# Patient Record
Sex: MALE | URBAN - METROPOLITAN AREA
[De-identification: names, ages, dates, MRNs, and addresses within clinical notes are randomized per-mention and may not be internally consistent; named-entity substitution may affect disease eponyms.]

---

## 2017-03-30 ENCOUNTER — APPOINTMENT (OUTPATIENT)
Dept: URBAN - METROPOLITAN AREA CLINIC 319 | Age: 72
Setting detail: DERMATOLOGY
End: 2017-03-30

## 2017-03-30 DIAGNOSIS — H61.03 CHONDRITIS OF EXTERNAL EAR: ICD-10-CM

## 2017-03-30 DIAGNOSIS — L82.1 OTHER SEBORRHEIC KERATOSIS: ICD-10-CM

## 2017-03-30 DIAGNOSIS — D22 MELANOCYTIC NEVI: ICD-10-CM

## 2017-03-30 PROBLEM — D22.5 MELANOCYTIC NEVI OF TRUNK: Status: ACTIVE | Noted: 2017-03-30

## 2017-03-30 PROBLEM — H61.031 CHONDRITIS OF RIGHT EXTERNAL EAR: Status: ACTIVE | Noted: 2017-03-30

## 2017-03-30 PROCEDURE — OTHER REASSURANCE: OTHER

## 2017-03-30 PROCEDURE — OTHER COUNSELING: OTHER

## 2017-03-30 PROCEDURE — 99213 OFFICE O/P EST LOW 20 MIN: CPT

## 2017-03-30 ASSESSMENT — LOCATION DETAILED DESCRIPTION DERM
LOCATION DETAILED: RIGHT SUPERIOR HELIX
LOCATION DETAILED: LEFT LATERAL FOREHEAD
LOCATION DETAILED: EPIGASTRIC SKIN
LOCATION DETAILED: LEFT RIB CAGE

## 2017-03-30 ASSESSMENT — LOCATION ZONE DERM
LOCATION ZONE: TRUNK
LOCATION ZONE: FACE
LOCATION ZONE: EAR

## 2017-03-30 ASSESSMENT — LOCATION SIMPLE DESCRIPTION DERM
LOCATION SIMPLE: ABDOMEN
LOCATION SIMPLE: RIGHT EAR
LOCATION SIMPLE: LEFT FOREHEAD

## 2018-06-27 ENCOUNTER — APPOINTMENT (OUTPATIENT)
Dept: URBAN - METROPOLITAN AREA CLINIC 319 | Age: 73
Setting detail: DERMATOLOGY
End: 2018-06-27

## 2018-06-27 DIAGNOSIS — B35.1 TINEA UNGUIUM: ICD-10-CM

## 2018-06-27 DIAGNOSIS — L82.1 OTHER SEBORRHEIC KERATOSIS: ICD-10-CM

## 2018-06-27 PROCEDURE — OTHER TREATMENT REGIMEN: OTHER

## 2018-06-27 PROCEDURE — OTHER PRESCRIPTION: OTHER

## 2018-06-27 PROCEDURE — 99213 OFFICE O/P EST LOW 20 MIN: CPT

## 2018-06-27 PROCEDURE — OTHER COUNSELING: OTHER

## 2018-06-27 PROCEDURE — OTHER REASSURANCE: OTHER

## 2018-06-27 RX ORDER — TRIAMCINOLONE ACETONIDE 1 MG/ML
LOTION TOPICAL
Qty: 1 | Refills: 0 | Status: ERX | COMMUNITY
Start: 2018-06-27

## 2018-06-27 ASSESSMENT — LOCATION SIMPLE DESCRIPTION DERM
LOCATION SIMPLE: ABDOMEN
LOCATION SIMPLE: LEFT GREAT TOE
LOCATION SIMPLE: RIGHT LOWER BACK

## 2018-06-27 ASSESSMENT — LOCATION ZONE DERM
LOCATION ZONE: TRUNK
LOCATION ZONE: TOENAIL

## 2018-06-27 ASSESSMENT — LOCATION DETAILED DESCRIPTION DERM
LOCATION DETAILED: LEFT GREAT TOENAIL
LOCATION DETAILED: RIGHT SUPERIOR MEDIAL LOWER BACK
LOCATION DETAILED: PERIUMBILICAL SKIN

## 2018-06-27 NOTE — PROCEDURE: TREATMENT REGIMEN
Detail Level: Detailed
Plan: If culture is negative will call in diprolene ointment if positive econozole cream ,lamisil \\nWill culture in  1 month with nurse
Otc Regimen: Apply sunscreen daily

## 2018-07-27 ENCOUNTER — APPOINTMENT (OUTPATIENT)
Dept: URBAN - METROPOLITAN AREA CLINIC 319 | Age: 73
Setting detail: DERMATOLOGY
End: 2018-07-27

## 2018-07-27 DIAGNOSIS — B35.1 TINEA UNGUIUM: ICD-10-CM

## 2018-07-27 PROCEDURE — OTHER NAIL CLIPPING FOR PAS: OTHER

## 2018-07-27 ASSESSMENT — LOCATION DETAILED DESCRIPTION DERM: LOCATION DETAILED: LEFT GREAT TOENAIL

## 2018-07-27 ASSESSMENT — LOCATION SIMPLE DESCRIPTION DERM: LOCATION SIMPLE: LEFT GREAT TOE

## 2018-07-27 ASSESSMENT — LOCATION ZONE DERM: LOCATION ZONE: TOENAIL

## 2018-09-19 ENCOUNTER — APPOINTMENT (OUTPATIENT)
Dept: URBAN - METROPOLITAN AREA CLINIC 319 | Age: 73
Setting detail: DERMATOLOGY
End: 2018-09-19

## 2018-09-19 DIAGNOSIS — B35.1 TINEA UNGUIUM: ICD-10-CM

## 2018-09-19 DIAGNOSIS — T07XXXA INSECT BITE, NONVENOMOUS, OF OTHER, MULTIPLE, AND UNSPECIFIED SITES, WITHOUT MENTION OF INFECTION: ICD-10-CM

## 2018-09-19 PROBLEM — S00.86XA INSECT BITE (NONVENOMOUS) OF OTHER PART OF HEAD, INITIAL ENCOUNTER: Status: ACTIVE | Noted: 2018-09-19

## 2018-09-19 PROCEDURE — OTHER REASSURANCE: OTHER

## 2018-09-19 PROCEDURE — OTHER NAIL CLIPPING FOR PAS: OTHER

## 2018-09-19 PROCEDURE — OTHER COUNSELING: OTHER

## 2018-09-19 PROCEDURE — 99213 OFFICE O/P EST LOW 20 MIN: CPT

## 2018-09-19 PROCEDURE — OTHER TREATMENT REGIMEN: OTHER

## 2018-09-19 ASSESSMENT — LOCATION DETAILED DESCRIPTION DERM
LOCATION DETAILED: LEFT GREAT TOENAIL
LOCATION DETAILED: RIGHT INFERIOR CENTRAL MALAR CHEEK

## 2018-09-19 ASSESSMENT — LOCATION SIMPLE DESCRIPTION DERM
LOCATION SIMPLE: LEFT GREAT TOE
LOCATION SIMPLE: RIGHT CHEEK

## 2018-09-19 ASSESSMENT — LOCATION ZONE DERM
LOCATION ZONE: FACE
LOCATION ZONE: TOENAIL

## 2018-09-19 NOTE — PROCEDURE: TREATMENT REGIMEN
Plan: If PAS is negative will give a stronger cortisone
Continue Regimen: Triamcinolone acetonide 0.1 % lotion (Apply twice a day to toenails)
Detail Level: Detailed

## 2018-09-27 ENCOUNTER — RX ONLY (RX ONLY)
Age: 73
End: 2018-09-27

## 2018-09-27 RX ORDER — BETAMETHASONE DIPROPIONATE 0.5 MG/G
OINTMENT TOPICAL
Qty: 1 | Refills: 0 | Status: ERX | COMMUNITY
Start: 2018-09-27

## 2019-04-02 ENCOUNTER — APPOINTMENT (OUTPATIENT)
Dept: URBAN - METROPOLITAN AREA CLINIC 319 | Age: 74
Setting detail: DERMATOLOGY
End: 2019-04-02

## 2019-04-02 DIAGNOSIS — L60.3 NAIL DYSTROPHY: ICD-10-CM

## 2019-04-02 PROCEDURE — OTHER COUNSELING: OTHER

## 2019-04-02 PROCEDURE — OTHER TREATMENT REGIMEN: OTHER

## 2019-04-02 ASSESSMENT — LOCATION SIMPLE DESCRIPTION DERM: LOCATION SIMPLE: LEFT GREAT TOE

## 2019-04-02 ASSESSMENT — LOCATION DETAILED DESCRIPTION DERM: LOCATION DETAILED: LEFT GREAT TOENAIL

## 2019-04-02 ASSESSMENT — LOCATION ZONE DERM: LOCATION ZONE: TOENAIL

## 2019-04-02 NOTE — PROCEDURE: TREATMENT REGIMEN
Detail Level: Detailed
Discontinue Regimen: diprolene ointment twice a day to affected nails.\\nTriamcinolone lotion

## 2024-04-23 ENCOUNTER — APPOINTMENT (OUTPATIENT)
Dept: URBAN - METROPOLITAN AREA CLINIC 310 | Age: 79
Setting detail: DERMATOLOGY
End: 2024-04-23

## 2024-04-23 DIAGNOSIS — L57.0 ACTINIC KERATOSIS: ICD-10-CM

## 2024-04-23 DIAGNOSIS — D22 MELANOCYTIC NEVI: ICD-10-CM

## 2024-04-23 DIAGNOSIS — L82.1 OTHER SEBORRHEIC KERATOSIS: ICD-10-CM

## 2024-04-23 DIAGNOSIS — Z12.83 ENCOUNTER FOR SCREENING FOR MALIGNANT NEOPLASM OF SKIN: ICD-10-CM

## 2024-04-23 DIAGNOSIS — D18.0 HEMANGIOMA: ICD-10-CM

## 2024-04-23 PROBLEM — D22.9 MELANOCYTIC NEVI, UNSPECIFIED: Status: ACTIVE | Noted: 2024-04-23

## 2024-04-23 PROBLEM — D18.01 HEMANGIOMA OF SKIN AND SUBCUTANEOUS TISSUE: Status: ACTIVE | Noted: 2024-04-23

## 2024-04-23 PROCEDURE — OTHER MIPS QUALITY: OTHER

## 2024-04-23 PROCEDURE — OTHER COUNSELING: OTHER

## 2024-04-23 PROCEDURE — 17000 DESTRUCT PREMALG LESION: CPT

## 2024-04-23 PROCEDURE — OTHER REASSURANCE: OTHER

## 2024-04-23 PROCEDURE — OTHER SUNSCREEN RECOMMENDATIONS: OTHER

## 2024-04-23 PROCEDURE — OTHER LIQUID NITROGEN: OTHER

## 2024-04-23 PROCEDURE — 99203 OFFICE O/P NEW LOW 30 MIN: CPT | Mod: 25

## 2024-04-23 PROCEDURE — 17003 DESTRUCT PREMALG LES 2-14: CPT

## 2024-04-23 ASSESSMENT — LOCATION DETAILED DESCRIPTION DERM
LOCATION DETAILED: NASAL SUPRATIP
LOCATION DETAILED: NASAL TIP
LOCATION DETAILED: NASAL DORSUM
LOCATION DETAILED: LEFT NASAL ALA

## 2024-04-23 ASSESSMENT — LOCATION SIMPLE DESCRIPTION DERM
LOCATION SIMPLE: LEFT NOSE
LOCATION SIMPLE: NOSE

## 2024-04-23 ASSESSMENT — LOCATION ZONE DERM: LOCATION ZONE: NOSE

## 2024-04-23 NOTE — PROCEDURE: MIPS QUALITY
Quality 226: Preventive Care And Screening: Tobacco Use: Screening And Cessation Intervention: Patient screened for tobacco use and is an ex/non-smoker
Quality 431: Preventive Care And Screening: Unhealthy Alcohol Use - Screening: Patient not identified as an unhealthy alcohol user when screened for unhealthy alcohol use using a systematic screening method
Detail Level: Detailed
Quality 47: Advance Care Plan: Advance Care Planning discussed and documented in the medical record; patient did not wish or was not able to name a surrogate decision maker or provide an advance care plan.
Quality 130: Documentation Of Current Medications In The Medical Record: Current Medications Documented

## 2025-04-23 ENCOUNTER — APPOINTMENT (OUTPATIENT)
Dept: URBAN - METROPOLITAN AREA CLINIC 310 | Age: 80
Setting detail: DERMATOLOGY
End: 2025-04-23

## 2025-04-23 DIAGNOSIS — H61.03 CHONDRITIS OF EXTERNAL EAR: ICD-10-CM

## 2025-04-23 DIAGNOSIS — Z71.89 OTHER SPECIFIED COUNSELING: ICD-10-CM

## 2025-04-23 DIAGNOSIS — D22 MELANOCYTIC NEVI: ICD-10-CM

## 2025-04-23 DIAGNOSIS — D18.0 HEMANGIOMA: ICD-10-CM

## 2025-04-23 DIAGNOSIS — L57.8 OTHER SKIN CHANGES DUE TO CHRONIC EXPOSURE TO NONIONIZING RADIATION: ICD-10-CM

## 2025-04-23 DIAGNOSIS — L82.1 OTHER SEBORRHEIC KERATOSIS: ICD-10-CM

## 2025-04-23 PROBLEM — D22.9 MELANOCYTIC NEVI, UNSPECIFIED: Status: ACTIVE | Noted: 2025-04-23

## 2025-04-23 PROBLEM — D18.01 HEMANGIOMA OF SKIN AND SUBCUTANEOUS TISSUE: Status: ACTIVE | Noted: 2025-04-23

## 2025-04-23 PROBLEM — H61.032 CHONDRITIS OF LEFT EXTERNAL EAR: Status: ACTIVE | Noted: 2025-04-23

## 2025-04-23 PROCEDURE — OTHER MIPS QUALITY: OTHER

## 2025-04-23 PROCEDURE — 17110 DESTRUCT B9 LESION 1-14: CPT

## 2025-04-23 PROCEDURE — OTHER PRESCRIPTION MEDICATION MANAGEMENT: OTHER

## 2025-04-23 PROCEDURE — OTHER SUNSCREEN RECOMMENDATIONS: OTHER

## 2025-04-23 PROCEDURE — OTHER REASSURANCE: OTHER

## 2025-04-23 PROCEDURE — OTHER COUNSELING: OTHER

## 2025-04-23 PROCEDURE — 99214 OFFICE O/P EST MOD 30 MIN: CPT | Mod: 25

## 2025-04-23 PROCEDURE — OTHER LIQUID NITROGEN: OTHER

## 2025-04-23 ASSESSMENT — LOCATION ZONE DERM: LOCATION ZONE: EAR

## 2025-04-23 ASSESSMENT — LOCATION DETAILED DESCRIPTION DERM: LOCATION DETAILED: LEFT SUPERIOR HELIX

## 2025-04-23 ASSESSMENT — LOCATION SIMPLE DESCRIPTION DERM: LOCATION SIMPLE: LEFT EAR

## 2025-04-23 NOTE — PROCEDURE: LIQUID NITROGEN
Spray Paint Text: The liquid nitrogen was applied to the skin utilizing a spray paint frosting technique.
Show Aperture Variable?: Yes
Render Note In Bullet Format When Appropriate: No
Medical Necessity Information: It is in your best interest to select a reason for this procedure from the list below. All of these items fulfill various CMS LCD requirements except the new and changing color options.
Post-Care Instructions: I reviewed with the patient in detail post-care instructions. Patient is to wear sunprotection, and avoid picking at any of the treated lesions. Pt may apply Vaseline to crusted or scabbing areas.
Medical Necessity Clause: This procedure was medically necessary because the lesions that were treated were:
Detail Level: Detailed
Consent: The patient's consent was obtained including but not limited to risks of crusting, scabbing, blistering, scarring, darker or lighter pigmentary change, recurrence, incomplete removal and infection.

## 2025-04-23 NOTE — PROCEDURE: PRESCRIPTION MEDICATION MANAGEMENT
Detail Level: Zone
Render In Strict Bullet Format?: No
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